# Patient Record
Sex: MALE | Race: WHITE | NOT HISPANIC OR LATINO
[De-identification: names, ages, dates, MRNs, and addresses within clinical notes are randomized per-mention and may not be internally consistent; named-entity substitution may affect disease eponyms.]

---

## 2023-03-03 ENCOUNTER — APPOINTMENT (OUTPATIENT)
Dept: UROLOGY | Facility: CLINIC | Age: 48
End: 2023-03-03
Payer: COMMERCIAL

## 2023-03-03 VITALS
HEIGHT: 65 IN | DIASTOLIC BLOOD PRESSURE: 77 MMHG | HEART RATE: 85 BPM | SYSTOLIC BLOOD PRESSURE: 127 MMHG | WEIGHT: 165 LBS | TEMPERATURE: 98.6 F | BODY MASS INDEX: 27.49 KG/M2 | OXYGEN SATURATION: 98 %

## 2023-03-03 DIAGNOSIS — Z78.9 OTHER SPECIFIED HEALTH STATUS: ICD-10-CM

## 2023-03-03 DIAGNOSIS — Z85.850 PERSONAL HISTORY OF MALIGNANT NEOPLASM OF THYROID: ICD-10-CM

## 2023-03-03 DIAGNOSIS — Z82.61 FAMILY HISTORY OF ARTHRITIS: ICD-10-CM

## 2023-03-03 DIAGNOSIS — F17.200 NICOTINE DEPENDENCE, UNSPECIFIED, UNCOMPLICATED: ICD-10-CM

## 2023-03-03 DIAGNOSIS — Z86.39 PERSONAL HISTORY OF OTHER ENDOCRINE, NUTRITIONAL AND METABOLIC DISEASE: ICD-10-CM

## 2023-03-03 PROCEDURE — 99204 OFFICE O/P NEW MOD 45 MIN: CPT

## 2023-03-03 RX ORDER — LEVOTHYROXINE SODIUM 0.07 MG/1
75 TABLET ORAL
Refills: 0 | Status: ACTIVE | COMMUNITY

## 2023-03-03 NOTE — PHYSICAL EXAM
[General Appearance - Well Developed] : well developed [General Appearance - Well Nourished] : well nourished [Normal Appearance] : normal appearance [Well Groomed] : well groomed [General Appearance - In No Acute Distress] : no acute distress [Edema] : no peripheral edema [Respiration, Rhythm And Depth] : normal respiratory rhythm and effort [Exaggerated Use Of Accessory Muscles For Inspiration] : no accessory muscle use [Abdomen Soft] : soft [Abdomen Tenderness] : non-tender [Costovertebral Angle Tenderness] : no ~M costovertebral angle tenderness [Normal Station and Gait] : the gait and station were normal for the patient's age [] : no rash [No Focal Deficits] : no focal deficits [Oriented To Time, Place, And Person] : oriented to person, place, and time [Affect] : the affect was normal [Mood] : the mood was normal [Not Anxious] : not anxious [No Palpable Adenopathy] : no palpable adenopathy [Penis Abnormality] : normal uncircumcised penis [Scrotum] : the scrotum was normal [Epididymis] : the epididymides were normal [Testes Tenderness] : no tenderness of the testes [Testes Mass (___cm)] : there were no testicular masses [FreeTextEntry1] : No evidence of an inguinal hernia.  No testicular masses.  Uncircumcised.

## 2023-03-03 NOTE — ASSESSMENT
[FreeTextEntry1] : Mr. Maurice is a very pleasant 47 year old man here today for testicular pain, screening for STDs.\par We discussed potential etiologies of scrotal pain\par UC.\par UA.\par GC/chlamydia.\par Ureaplasma/mycoplasma.\par Syphilis.\par HIV.\par Herpes I & II.\par I will call with results

## 2023-03-03 NOTE — HISTORY OF PRESENT ILLNESS
[Currently Experiencing ___] :  [unfilled] [2] : 2 [FreeTextEntry1] : Mr. Maurice is a very pleasant 47 year old man here today for testicular pain screening for STDs.\par He reports that this started 1 year ago, reports that it comes and goes.\par Reports that it is not so painful but is noticeable.  He reports that it is exacerbated by exercise or lifting heavy objects\par Reports a strong urinary stream.\par Nocturia x0-1.\par Denies any hematuria or dysuria.\par Reports girlfriend frequently gets BV; most recently tested for Gardnerella Vaginalis.\par Was advised by her GYN for him to be checked out.\par Current smoker; 1/2 PPD 25 years.

## 2023-03-06 LAB
APPEARANCE: CLEAR
BACTERIA UR CULT: NORMAL
BACTERIA: NEGATIVE
BILIRUBIN URINE: NEGATIVE
BLOOD URINE: NEGATIVE
COLOR: NORMAL
GLUCOSE QUALITATIVE U: NEGATIVE
HIV1+2 AB SPEC QL IA.RAPID: NONREACTIVE
HSV 1+2 IGG SER IA-IMP: NEGATIVE
HSV 1+2 IGG SER IA-IMP: POSITIVE
HSV1 IGG SER QL: >62.2 INDEX
HSV2 IGG SER QL: 0.06 INDEX
HYALINE CASTS: 0 /LPF
KETONES URINE: NEGATIVE
LEUKOCYTE ESTERASE URINE: NEGATIVE
MICROSCOPIC-UA: NORMAL
N GONORRHOEA RRNA SPEC QL NAA+PROBE: NOT DETECTED
NITRITE URINE: NEGATIVE
PH URINE: 6
PROTEIN URINE: NEGATIVE
RED BLOOD CELLS URINE: 1 /HPF
SOURCE AMPLIFICATION: NORMAL
SPECIFIC GRAVITY URINE: 1.01
SQUAMOUS EPITHELIAL CELLS: 0 /HPF
T PALLIDUM AB SER QL IA: NEGATIVE
UROBILINOGEN URINE: NORMAL
WHITE BLOOD CELLS URINE: 0 /HPF

## 2023-03-08 LAB
HSV1 IGM SER QL: NEGATIVE
HSV2 AB FLD-ACNC: NEGATIVE

## 2023-03-13 LAB
MYCOPLASMA HOMINIS CULTURE: NEGATIVE
UREAPLASMA CULTURE: NEGATIVE

## 2023-03-22 ENCOUNTER — APPOINTMENT (OUTPATIENT)
Dept: UROLOGY | Facility: CLINIC | Age: 48
End: 2023-03-22
Payer: COMMERCIAL

## 2023-03-22 VITALS
OXYGEN SATURATION: 97 % | BODY MASS INDEX: 27.49 KG/M2 | DIASTOLIC BLOOD PRESSURE: 60 MMHG | SYSTOLIC BLOOD PRESSURE: 115 MMHG | HEIGHT: 65 IN | WEIGHT: 165 LBS | TEMPERATURE: 98 F | HEART RATE: 68 BPM

## 2023-03-22 DIAGNOSIS — N50.819 TESTICULAR PAIN, UNSPECIFIED: ICD-10-CM

## 2023-03-22 DIAGNOSIS — Z11.3 ENCOUNTER FOR SCREENING FOR INFECTIONS WITH A PREDOMINANTLY SEXUAL MODE OF TRANSMISSION: ICD-10-CM

## 2023-03-22 PROCEDURE — 99213 OFFICE O/P EST LOW 20 MIN: CPT

## 2023-03-22 NOTE — HISTORY OF PRESENT ILLNESS
[None] : None [FreeTextEntry1] : Mr. Maurice is a very pleasant 47 year old man here today for history of STI screening.\par He reports that he has been doing well since he was here last.\par Denies any hematuria, dysuria or urinary retention.\par Reports that testicular discomfort still comes and goes but it is not so bothersome.

## 2023-03-22 NOTE — ASSESSMENT
[FreeTextEntry1] : Mr. Maurice is a very pleasant 47 year old man here today for history of STI screening.\par He reports that he has been doing well since he was here last.\par Denies any hematuria, dysuria or urinary retention.\par Reports that testicular discomfort still comes and goes but it is not so bothersome.\par Syphilis negative.\par GC/chlamydia negative.\par UC negative.\par UA negative.\par Ureaplasma/mycoplasma negative.\par HIV negative.\par Herpes I & II IgM negative.\par Herpes I IgG positive II IgG negative.\par RTO as needed.

## 2024-03-18 ENCOUNTER — NON-APPOINTMENT (OUTPATIENT)
Age: 49
End: 2024-03-18

## 2024-03-20 ENCOUNTER — APPOINTMENT (OUTPATIENT)
Dept: UROLOGY | Facility: CLINIC | Age: 49
End: 2024-03-20
Payer: COMMERCIAL

## 2024-03-20 VITALS
TEMPERATURE: 98.2 F | RESPIRATION RATE: 17 BRPM | SYSTOLIC BLOOD PRESSURE: 113 MMHG | DIASTOLIC BLOOD PRESSURE: 74 MMHG | HEIGHT: 65 IN | OXYGEN SATURATION: 96 % | HEART RATE: 71 BPM | BODY MASS INDEX: 26.99 KG/M2 | WEIGHT: 162 LBS

## 2024-03-20 DIAGNOSIS — Z12.5 ENCOUNTER FOR SCREENING FOR MALIGNANT NEOPLASM OF PROSTATE: ICD-10-CM

## 2024-03-20 DIAGNOSIS — Z00.00 ENCOUNTER FOR GENERAL ADULT MEDICAL EXAMINATION W/OUT ABNORMAL FINDINGS: ICD-10-CM

## 2024-03-20 PROCEDURE — G2211 COMPLEX E/M VISIT ADD ON: CPT | Mod: NC,1L

## 2024-03-20 PROCEDURE — 99213 OFFICE O/P EST LOW 20 MIN: CPT

## 2024-03-20 RX ORDER — LEVOTHYROXINE SODIUM 0.12 MG/1
125 TABLET ORAL
Refills: 0 | Status: ACTIVE | COMMUNITY

## 2024-03-20 NOTE — PHYSICAL EXAM
[General Appearance - Well Developed] : well developed [] : no respiratory distress [Abdomen Soft] : soft [Urethral Meatus] : meatus normal [Penis Abnormality] : normal uncircumcised penis [Epididymis] : the epididymides were normal [Testes Tenderness] : no tenderness of the testes [No Focal Deficits] : no focal deficits [Testes Mass (___cm)] : there were no testicular masses

## 2024-03-20 NOTE — HISTORY OF PRESENT ILLNESS
[FreeTextEntry1] : 48M presenting for follow-up with new complaint of increased urinary frequency  Patient presents due to concern for irritation at the tip of his penis after holding his urine for extended periods of time. Does not describe this as a pain, more as an itch and discomfort. Also notices an occasional change in the smell at this time as well. He was more concerned due to the anxiety that something was wrong and comes today to be evaluated. Denied any dysuria, hematuria, fevers, or changes in his stream when this occurs.  This does not occur during the day. He only notices it when he is traveling for work and has to hold his urine for long periods of time. Denies any pain.   Fluids: 3L water a day (states more frequently since going to the gym and going to the sauna), 3-4 mugs of coffee a day (stop before 6pm) Voids: 1x nocturia, urinating every hour, no difficulty initiating or maintaining a stream, no hematuria or fevers, no dysuria.  BM: hx of constipation, improved as of lately

## 2024-03-20 NOTE — ASSESSMENT
[FreeTextEntry1] : 48M presenting for follow-up with new complaint of increased urinary frequency  Plan -We discussed potential etiologies of his symptoms - PSA - hgb A1c - BMP -Check urinalysis -Urine culture -We discussed the relationship between increased caffeine intake and his urinary symptoms and I encouraged him to try to decrease caffeine intake -Moderate water intake -Follow-up in 6 months  Patient is being seen today for evaluation and management of a chronic and longitudinal ongoing condition and I am of the primary treating physician

## 2024-03-21 LAB
ANION GAP SERPL CALC-SCNC: 15 MMOL/L
APPEARANCE: CLEAR
BACTERIA: NEGATIVE /HPF
BILIRUBIN URINE: NEGATIVE
BLOOD URINE: NEGATIVE
BUN SERPL-MCNC: 16 MG/DL
CALCIUM OXALATE CRYSTALS: PRESENT
CALCIUM SERPL-MCNC: 9.5 MG/DL
CAST: 0 /LPF
CHLORIDE SERPL-SCNC: 102 MMOL/L
CO2 SERPL-SCNC: 23 MMOL/L
COLOR: NORMAL
CREAT SERPL-MCNC: 0.84 MG/DL
EGFR: 108 ML/MIN/1.73M2
EPITHELIAL CELLS: 0 /HPF
GLUCOSE QUALITATIVE U: NEGATIVE MG/DL
GLUCOSE SERPL-MCNC: 66 MG/DL
KETONES URINE: NEGATIVE MG/DL
LEUKOCYTE ESTERASE URINE: NEGATIVE
MICROSCOPIC-UA: NORMAL
NITRITE URINE: NEGATIVE
PH URINE: 6
POTASSIUM SERPL-SCNC: 4.6 MMOL/L
PROTEIN URINE: NEGATIVE MG/DL
PSA FREE FLD-MCNC: 31 %
PSA FREE SERPL-MCNC: 0.28 NG/ML
PSA SERPL-MCNC: 0.91 NG/ML
RED BLOOD CELLS URINE: 2 /HPF
REVIEW: NORMAL
SODIUM SERPL-SCNC: 140 MMOL/L
SPECIFIC GRAVITY URINE: 1.03
UROBILINOGEN URINE: 0.2 MG/DL
WHITE BLOOD CELLS URINE: 0 /HPF

## 2024-03-22 LAB — BACTERIA UR CULT: NORMAL

## 2024-10-09 ENCOUNTER — EMERGENCY (EMERGENCY)
Facility: HOSPITAL | Age: 49
LOS: 1 days | Discharge: HOME | End: 2024-10-09
Attending: EMERGENCY MEDICINE
Payer: COMMERCIAL

## 2024-10-09 VITALS
DIASTOLIC BLOOD PRESSURE: 79 MMHG | WEIGHT: 166.01 LBS | HEART RATE: 62 BPM | SYSTOLIC BLOOD PRESSURE: 135 MMHG | OXYGEN SATURATION: 100 % | HEIGHT: 65 IN | RESPIRATION RATE: 18 BRPM | TEMPERATURE: 98 F

## 2024-10-09 VITALS
TEMPERATURE: 98 F | SYSTOLIC BLOOD PRESSURE: 124 MMHG | DIASTOLIC BLOOD PRESSURE: 64 MMHG | OXYGEN SATURATION: 97 % | RESPIRATION RATE: 19 BRPM | HEART RATE: 70 BPM

## 2024-10-09 PROCEDURE — 99284 EMERGENCY DEPT VISIT MOD MDM: CPT | Mod: 25

## 2024-10-09 PROCEDURE — 96374 THER/PROPH/DIAG INJ IV PUSH: CPT

## 2024-10-09 PROCEDURE — 72192 CT PELVIS W/O DYE: CPT | Mod: MC

## 2024-10-09 PROCEDURE — 72192 CT PELVIS W/O DYE: CPT | Mod: 26,MC

## 2024-10-09 PROCEDURE — 96375 TX/PRO/DX INJ NEW DRUG ADDON: CPT

## 2024-10-09 PROCEDURE — 99285 EMERGENCY DEPT VISIT HI MDM: CPT

## 2024-10-09 RX ORDER — HYDROMORPHONE HYDROCHLORIDE 1 MG/ML
0.5 INJECTION, SOLUTION INTRAMUSCULAR; INTRAVENOUS; SUBCUTANEOUS ONCE
Refills: 0 | Status: DISCONTINUED | OUTPATIENT
Start: 2024-10-09 | End: 2024-10-09

## 2024-10-09 RX ORDER — OXYCODONE AND ACETAMINOPHEN 5; 325 MG/1; MG/1
1 TABLET ORAL
Qty: 8 | Refills: 0
Start: 2024-10-09 | End: 2024-10-11

## 2024-10-09 RX ORDER — DIAZEPAM 10 MG/1
5 TABLET ORAL ONCE
Refills: 0 | Status: DISCONTINUED | OUTPATIENT
Start: 2024-10-09 | End: 2024-10-09

## 2024-10-09 RX ORDER — KETOROLAC TROMETHAMINE 10 MG/1
15 TABLET, FILM COATED ORAL ONCE
Refills: 0 | Status: DISCONTINUED | OUTPATIENT
Start: 2024-10-09 | End: 2024-10-09

## 2024-10-09 RX ORDER — LIDOCAINE 50 MG/G
1 CREAM TOPICAL ONCE
Refills: 0 | Status: COMPLETED | OUTPATIENT
Start: 2024-10-09 | End: 2024-10-09

## 2024-10-09 RX ORDER — LIDOCAINE 50 MG/G
1 CREAM TOPICAL
Qty: 3 | Refills: 0
Start: 2024-10-09 | End: 2024-10-23

## 2024-10-09 RX ADMIN — DIAZEPAM 5 MILLIGRAM(S): 10 TABLET ORAL at 02:30

## 2024-10-09 RX ADMIN — LIDOCAINE 1 PATCH: 50 CREAM TOPICAL at 02:29

## 2024-10-09 RX ADMIN — KETOROLAC TROMETHAMINE 15 MILLIGRAM(S): 10 TABLET, FILM COATED ORAL at 02:30

## 2024-10-09 RX ADMIN — HYDROMORPHONE HYDROCHLORIDE 0.5 MILLIGRAM(S): 1 INJECTION, SOLUTION INTRAMUSCULAR; INTRAVENOUS; SUBCUTANEOUS at 04:58

## 2024-10-09 RX ADMIN — Medication 750 MILLIGRAM(S): at 04:57

## 2024-10-09 NOTE — ED PROVIDER NOTE - OBJECTIVE STATEMENT
49-year-old smoker and  presents with acute onset of hip pain.  Patient says he  woke up with right lower back pain  radiating to his right groin.   he denies dysuria flank pain recent trauma prior history of back pain.  He did workout about 2 weeks ago.

## 2024-10-09 NOTE — ED PROVIDER NOTE - PATIENT PORTAL LINK FT
You can access the FollowMyHealth Patient Portal offered by [facility] by registering at the following website: [email or web address]. By joining [employer]’s FollowMyHealth portal, you will also be able to view your health information using other applications (apps) compatible with our system.

## 2024-10-09 NOTE — ED PROVIDER NOTE - CLINICAL SUMMARY MEDICAL DECISION MAKING FREE TEXT BOX
Smoker with acute onset nontraumatic pain, CT ordered to rule out malignant fracture however shows live room chair.  Will give RICE and follow-up with Ortho.

## 2024-10-09 NOTE — ED PROVIDER NOTE - PHYSICAL EXAMINATION
General: moderate  distress, appears stated age  HEENT: normocephalic, atraumatic   Respiratory: normal work of breathing  MSK: no swelling or tenderness of lower extremities,moving with difficulty, need to assit left t omove   Skin: warm, dry  Neuro: A&Ox3, cranial nerves II-XII intact, 5/5 strength in all extremities, no sensory deficits, normal gait   Psych: appropriate affect

## 2024-10-09 NOTE — ED PROVIDER NOTE - NSFOLLOWUPINSTRUCTIONS_ED_ALL_ED_FT
Understanding a Labrum Tear   What is a Labrum?    The labrum is a piece of cartilage that surrounds the socket of your shoulder or hip joint. It helps stabilize the joint and keeps the bones in place.   What is a Labrum Tear?    A labrum tear occurs when the cartilage is damaged. This can happen due to injury, repetitive motion, or wear and tear over time. It often causes pain, instability, and decreased range of motion in the affected joint.   Symptoms of a Labrum Tear   Pain in the shoulder or hip   A feeling of instability in the joint   Clicking or popping sounds during movement   Reduced range of motion   Swelling or tenderness   How to Help Heal a Labrum Tear   Rest    Avoid activities that worsen the pain.  Give your joint time to heal.   Ice    Apply ice packs to the affected area for 15-20 minutes every few hours. This helps reduce swelling and pain.   Compression    Use a compression bandage to help minimize swelling and provide support.   Elevation    Keep the affected joint elevated above heart level when possible to reduce swelling.   Physical Therapy    A physical therapist can provide exercises to strengthen the surrounding muscles and improve stability and range of motion.   Pain Relief    Over-the-counter pain relievers (like ibuprofen or acetaminophen) can help manage pain and inflammation. Always follow the recommended dosage and consult a healthcare provider if unsure.   Avoid Certain Movements    Steer clear of activities that put strain on the joint, such as heavy lifting or high-impact sports.   Follow-Up Care    Regular check-ups with a healthcare provider can help monitor progress and decide if further treatment, such as injections or surgery, is necessary.   When to Seek Medical Attention   If you experience severe pain, significant swelling, or if your joint feels unstable, it's important to see a doctor for a proper diagnosis and treatment plan.     By following these steps, you can help support your healing process and get back to your regular activities safely!

## 2024-10-09 NOTE — ED ADULT NURSE NOTE - NSFALLUNIVINTERV_ED_ALL_ED
Bed/Stretcher in lowest position, wheels locked, appropriate side rails in place/Call bell, personal items and telephone in reach/Instruct patient to call for assistance before getting out of bed/chair/stretcher/Non-slip footwear applied when patient is off stretcher/[place] to call system/Physically safe environment - no spills, clutter or unnecessary equipment/Purposeful proactive rounding/Room/bathroom lighting operational, light cord in reach